# Patient Record
Sex: FEMALE | Race: WHITE | NOT HISPANIC OR LATINO | Employment: OTHER | URBAN - METROPOLITAN AREA
[De-identification: names, ages, dates, MRNs, and addresses within clinical notes are randomized per-mention and may not be internally consistent; named-entity substitution may affect disease eponyms.]

---

## 2019-07-31 ENCOUNTER — TELEPHONE (OUTPATIENT)
Dept: SCHEDULING | Facility: IMAGING CENTER | Age: 29
End: 2019-07-31

## 2019-09-19 ENCOUNTER — OFFICE VISIT (OUTPATIENT)
Dept: MEDICAL GROUP | Facility: MEDICAL CENTER | Age: 29
End: 2019-09-19
Attending: FAMILY MEDICINE
Payer: MEDICAID

## 2019-09-19 VITALS
RESPIRATION RATE: 14 BRPM | BODY MASS INDEX: 23.95 KG/M2 | OXYGEN SATURATION: 97 % | SYSTOLIC BLOOD PRESSURE: 92 MMHG | HEIGHT: 66 IN | HEART RATE: 90 BPM | DIASTOLIC BLOOD PRESSURE: 48 MMHG | WEIGHT: 149 LBS | TEMPERATURE: 97.9 F

## 2019-09-19 DIAGNOSIS — Z30.9 ENCOUNTER FOR CONTRACEPTIVE MANAGEMENT, UNSPECIFIED TYPE: ICD-10-CM

## 2019-09-19 DIAGNOSIS — R53.83 OTHER FATIGUE: ICD-10-CM

## 2019-09-19 DIAGNOSIS — R11.0 NAUSEA: ICD-10-CM

## 2019-09-19 DIAGNOSIS — F41.8 DEPRESSION WITH ANXIETY: ICD-10-CM

## 2019-09-19 PROCEDURE — 99214 OFFICE O/P EST MOD 30 MIN: CPT | Performed by: FAMILY MEDICINE

## 2019-09-19 PROCEDURE — 99213 OFFICE O/P EST LOW 20 MIN: CPT | Performed by: FAMILY MEDICINE

## 2019-09-19 RX ORDER — ONDANSETRON 4 MG/1
15 TABLET, FILM COATED ORAL EVERY 4 HOURS PRN
Qty: 20 TAB | Refills: 0 | Status: SHIPPED | OUTPATIENT
Start: 2019-09-19 | End: 2019-09-23 | Stop reason: SDUPTHER

## 2019-09-19 RX ORDER — NORETHINDRONE ACETATE AND ETHINYL ESTRADIOL .02; 1 MG/1; MG/1
1 TABLET ORAL DAILY
Qty: 28 TAB | Refills: 11 | Status: SHIPPED | OUTPATIENT
Start: 2019-09-19

## 2019-09-19 RX ORDER — OMEPRAZOLE 20 MG/1
20 CAPSULE, DELAYED RELEASE ORAL DAILY
Qty: 30 CAP | Refills: 1 | Status: SHIPPED | OUTPATIENT
Start: 2019-09-19

## 2019-09-19 SDOH — HEALTH STABILITY: MENTAL HEALTH: HOW OFTEN DO YOU HAVE 6 OR MORE DRINKS ON ONE OCCASION?: LESS THAN MONTHLY

## 2019-09-19 SDOH — HEALTH STABILITY: MENTAL HEALTH: HOW MANY STANDARD DRINKS CONTAINING ALCOHOL DO YOU HAVE ON A TYPICAL DAY?: 3 OR 4

## 2019-09-19 SDOH — HEALTH STABILITY: MENTAL HEALTH: HOW OFTEN DO YOU HAVE A DRINK CONTAINING ALCOHOL?: 2-3 TIMES A WEEK

## 2019-09-19 ASSESSMENT — PATIENT HEALTH QUESTIONNAIRE - PHQ9
5. POOR APPETITE OR OVEREATING: 0 - NOT AT ALL
CLINICAL INTERPRETATION OF PHQ2 SCORE: 4
SUM OF ALL RESPONSES TO PHQ QUESTIONS 1-9: 12

## 2019-09-19 NOTE — PROGRESS NOTES
CC: Here to establish care, concerns about chronic nausea    HPI: New patient  Sandhya presents today to establish care, 28 years old female with past medical history significant for depression and anxiety, chronic nausea, moved recently from [place].  Lives with her male partner and works as a writer self-employed, reviewed medical problems medications past surgical and family/social history discussed the following concerns as follows today:      Nausea  Reports having this chronic nausea that is not allowing her to eat when she has it, for at least one year and a half now, on and off.  Recently she has been doing a lot of dietary changes and habits that helped a little bit resolving her nausea and epigastric discomfort, yet she is concerned on why she develops the nausea from the start and would like that to be further investigated, patient said with her previous PCP they did full work-up of hepatitis and CMP and CBC and all her blood work were negative no ultrasound was done.  She always thought that her nausea may be related to her anxiety or the opposite may be because she is not having the nausea all the time that is why she is depressed and anxious.  Patient denies abdominal pain, denies fever denies unintentional weight loss, nausea is more with food.  Tried over-the-counter Tums but now she is not taking anything for that    Depression with anxiety  Reports depression and anxiety without suicidal ideation she said at some point she was severely depressed of thinking that why she is living but she never had a plan or thinking to kill herself.  Not taking any medication at this time would like me to refer her to see psychiatry and therapist for further management    Encounter for contraceptive management, unspecified type  Patient on oral contraceptive pills for more than 6 months now, she is wanting to continue contraception on pills and would like me to refill the medication today last menstruation was 2  weeks ago, regular menstruation on pills     Other fatigue    Reports in general fatigue and tiredness and low energy without unintentional weight loss with her chronic nausea    Current Outpatient Medications   Medication Sig Dispense Refill   • norethindrone-ethinyl estradiol (MICROGESTIN 1/20) 1-20 MG-MCG per tablet Take 1 Tab by mouth every day. 28 Tab 11   • omeprazole (PRILOSEC) 20 MG delayed-release capsule Take 1 Cap by mouth every day. 30 Cap 1   • ondansetron (ZOFRAN) 4 MG Tab tablet Take 4 Tabs by mouth every four hours as needed for Nausea/Vomiting for up to 15 days. 20 Tab 0     No current facility-administered medications for this visit.          Allergies as of 09/19/2019   • (No Known Allergies)        ROS: Denies any chest pain, Shortness of breath, Changes bowel or bladder, Lower extremity edema.    Physical Exam:  Gen.: Well-developed, well-nourished, no apparent distress,pleasant and cooperative with the examination  Skin:  Warm and dry with good turgor. No rashes or suspicious lesions in visible areas  Eye: PERRLA, conjunctiva and sclera clear, lids normal  HEENT: Normocephalic/atraumatic, sinuses nontender with palpation, TMs clear, nares patent with pink mucosa and clear rhinorrhea, lips without lesions, oropharynx clear.  Neck: Trachea midline,no masses or adenopathy  Thyroid: normal consistency and size. No masses or nodules. Not tender with palpation.  Cor: Regular rate and rhythm without murmur, gallop or rub.  Lungs: Respirations unlabored.Clear to auscultation with equal breath sounds bilaterally. No wheezes, rhonchi.  Abdomen: Soft nontender without hepatosplenomegaly or masses appreciated, normoactive bowel sounds. No hernias.  Extremities: No cyanosis, clubbing or edema, Symmetrical without deformities or malformations. Pulses 2+ and symmetrical both upper and lower extremities  Lymphatic: No abnormal adenopathy of the neck groin or axillae.  Psych: Alert and oriented x 3.Normal  affect, judgement,insight and memory.        Assessment and Plan.   28 y.o. female here to establish care    1. Nausea  Chronic problem, first time addressed by me today, discussed with the patient differential diagnosis includes gallbladder disease, gastritis, and anxiety.  We will do blood work and evaluation by an ultrasound for further evaluation advised to use omeprazole and Zofran as needed for her GI symptoms, will consider in the future if work-up is negative to refer her to see GI for further evaluation  - omeprazole (PRILOSEC) 20 MG delayed-release capsule; Take 1 Cap by mouth every day.  Dispense: 30 Cap; Refill: 1  - ondansetron (ZOFRAN) 4 MG Tab tablet; Take 4 Tabs by mouth every four hours as needed for Nausea/Vomiting for up to 15 days.  Dispense: 20 Tab; Refill: 0  - Comp Metabolic Panel; Future  - CBC WITH DIFFERENTIAL; Future  - LIPASE; Future  - OCCULT BLOOD FECES IMMUNOASSAY; Future  - TSH WITH REFLEX TO FT4; Future  - US-ABDOMEN COMPLETE SURVEY; Future  - US-RUQ; Future    2. Depression with anxiety  No suicidal ideations requesting referral to see psychiatry and therapist, I will check her thyroid function  - REFERRAL TO PSYCHIATRY    3. Encounter for contraceptive management, unspecified type  Refilled pills as patient direction would like to continue her oral contraceptive pills  - norethindrone-ethinyl estradiol (MICROGESTIN 1/20) 1-20 MG-MCG per tablet; Take 1 Tab by mouth every day.  Dispense: 28 Tab; Refill: 11    4. Other fatigue  Will do work up to R/O DM, Anemia , Thyroid disease , Kidney disease, might be related to her depression    - omeprazole (PRILOSEC) 20 MG delayed-release capsule; Take 1 Cap by mouth every day.  Dispense: 30 Cap; Refill: 1  - ondansetron (ZOFRAN) 4 MG Tab tablet; Take 4 Tabs by mouth every four hours as needed for Nausea/Vomiting for up to 15 days.  Dispense: 20 Tab; Refill: 0  - Comp Metabolic Panel; Future  - CBC WITH DIFFERENTIAL; Future  - LIPASE; Future  -  OCCULT BLOOD FECES IMMUNOASSAY; Future  - TSH WITH REFLEX TO FT4; Future  - VITAMIN D,25 HYDROXY; Future      Please note that this dictation was created using voice recognition software. I have made every reasonable attempt to correct obvious errors but there may be errors of grammar and content that I may have overlooked prior to finalization of this note.

## 2019-09-23 DIAGNOSIS — R11.0 NAUSEA: ICD-10-CM

## 2019-09-23 DIAGNOSIS — R53.83 OTHER FATIGUE: ICD-10-CM

## 2019-09-24 RX ORDER — ONDANSETRON 4 MG/1
15 TABLET, FILM COATED ORAL EVERY 4 HOURS PRN
Qty: 20 TAB | Refills: 0 | Status: SHIPPED | OUTPATIENT
Start: 2019-09-24 | End: 2019-10-09

## 2019-10-01 ENCOUNTER — HOSPITAL ENCOUNTER (OUTPATIENT)
Dept: RADIOLOGY | Facility: MEDICAL CENTER | Age: 29
End: 2019-10-01
Attending: FAMILY MEDICINE
Payer: MEDICAID

## 2019-10-01 DIAGNOSIS — R11.0 NAUSEA: ICD-10-CM

## 2019-10-01 PROCEDURE — 76700 US EXAM ABDOM COMPLETE: CPT

## 2019-10-02 ENCOUNTER — APPOINTMENT (OUTPATIENT)
Dept: LAB | Facility: MEDICAL CENTER | Age: 29
End: 2019-10-02
Payer: MEDICAID

## 2019-10-15 ENCOUNTER — OFFICE VISIT (OUTPATIENT)
Dept: MEDICAL GROUP | Facility: MEDICAL CENTER | Age: 29
End: 2019-10-15
Attending: FAMILY MEDICINE
Payer: MEDICAID

## 2019-10-15 VITALS
HEART RATE: 70 BPM | BODY MASS INDEX: 24.11 KG/M2 | DIASTOLIC BLOOD PRESSURE: 62 MMHG | HEIGHT: 66 IN | OXYGEN SATURATION: 97 % | WEIGHT: 150 LBS | TEMPERATURE: 97.4 F | SYSTOLIC BLOOD PRESSURE: 112 MMHG | RESPIRATION RATE: 14 BRPM

## 2019-10-15 DIAGNOSIS — Z30.9 ENCOUNTER FOR CONTRACEPTIVE MANAGEMENT, UNSPECIFIED TYPE: ICD-10-CM

## 2019-10-15 DIAGNOSIS — Z91.018 FOOD ALLERGY: ICD-10-CM

## 2019-10-15 DIAGNOSIS — F41.8 ANXIOUS DEPRESSION: ICD-10-CM

## 2019-10-15 PROCEDURE — 99214 OFFICE O/P EST MOD 30 MIN: CPT | Performed by: FAMILY MEDICINE

## 2019-10-15 PROCEDURE — 99213 OFFICE O/P EST LOW 20 MIN: CPT | Performed by: FAMILY MEDICINE

## 2019-10-15 NOTE — PROGRESS NOTES
Chief Complaint:   Chief Complaint   Patient presents with   • Results     labs, done for evaluation of chronic nausea       HPI:Established patient   Sandhya Moyer is a 28 y.o. female who presents for follow-up, discussed the following concerns as follows today:       Food allergy  Patient reports that she noticed that her chronic nausea, has improved after she stopped eating eggs, she is requesting allergy testing and evaluation for possible food allergies.  Denies abdominal pain today, reviewed with the patient all lab work results which shows no evidence of abnormality in the liver function test or pancreatic function test, normal ultrasound.    Anxious depression    Reports chronic and anxiety and depression, patient does not want to use any type of antidepressants or medications at this time requesting to have a referral to see a therapist.  Also discussed with the patient today changing oral contraceptive pills from hormonal to nonhormonal contraception because she is also suspecting that the hormonal treatment is causing her some emotional changes.    Past medical history, family history, social history and medications reviewed and updated in the record. Today   Current medications, problem list and allergies reviewed in Epic today   Health maintenance topics are reviewed and updated.    There are no active problems to display for this patient.    Family History   Problem Relation Age of Onset   • Alcohol abuse Father    • Hypertension Father    • Psychiatric Illness Sister    • Cancer Maternal Grandfather         colon cancer      Social History     Socioeconomic History   • Marital status: Single     Spouse name: Not on file   • Number of children: Not on file   • Years of education: Not on file   • Highest education level: Not on file   Occupational History   • Not on file   Social Needs   • Financial resource strain: Not on file   • Food insecurity:     Worry: Not on file     Inability: Not on file   •  "Transportation needs:     Medical: Not on file     Non-medical: Not on file   Tobacco Use   • Smoking status: Never Smoker   • Smokeless tobacco: Never Used   Substance and Sexual Activity   • Alcohol use: Yes     Alcohol/week: 1.8 oz     Types: 3 Cans of beer per week     Frequency: 2-3 times a week     Drinks per session: 3 or 4     Binge frequency: Less than monthly   • Drug use: Yes     Types: Cocaine, Inhaled     Comment: rare   • Sexual activity: Yes     Partners: Male     Birth control/protection: Pill   Lifestyle   • Physical activity:     Days per week: Not on file     Minutes per session: Not on file   • Stress: Not on file   Relationships   • Social connections:     Talks on phone: Not on file     Gets together: Not on file     Attends Zoroastrian service: Not on file     Active member of club or organization: Not on file     Attends meetings of clubs or organizations: Not on file     Relationship status: Not on file   • Intimate partner violence:     Fear of current or ex partner: Not on file     Emotionally abused: Not on file     Physically abused: Not on file     Forced sexual activity: Not on file   Other Topics Concern   • Not on file   Social History Narrative   • Not on file     Current Outpatient Medications   Medication Sig Dispense Refill   • norethindrone-ethinyl estradiol (MICROGESTIN 1/20) 1-20 MG-MCG per tablet Take 1 Tab by mouth every day. 28 Tab 11   • omeprazole (PRILOSEC) 20 MG delayed-release capsule Take 1 Cap by mouth every day. 30 Cap 1     No current facility-administered medications for this visit.            Review Of Systems  As documented in HPI above  PHYSICAL EXAMINATION:    /62 (BP Location: Left arm, Patient Position: Sitting, BP Cuff Size: Adult)   Pulse 70   Temp 36.3 °C (97.4 °F) (Temporal)   Resp 14   Ht 1.664 m (5' 5.5\")   Wt 68 kg (150 lb)   SpO2 97%   BMI 24.58 kg/m²   Gen.: Well-developed, well-nourished, no apparent distress, pleasant and cooperative " with the examination  HEENT: Normocephalic/atraumatic,  Neck: No JVD or bruits, no adenopathy  Cor: Regular rate and rhythm without murmur gallop or rub  Lungs: Clear to auscultation with equal breath sounds bilaterally. No wheezes, rhonchi.  Abdomen: Soft nontender without hepatosplenomegaly or masses appreciated, normoactive bowel sounds  Extremities: No cyanosis, clubbing or edema          ASSESSMENT/Plan:  1. Food allergy   possibly patient is having allergies from eggs, advised to do allergy testing, place referral to allergist today, nausea improved as long as she is avoiding eggs advised to continue avoiding eggs in her food and also discussed to try egg whites separately than the ER conceive may be she is allergic to you to the [place] only    REFERRAL TO ALLERGY   2. Anxious depression   patient declines the use of medications, advised to follow-up with therapist, also discussed possibility of stopping oral contraceptive pills to see if hormones are the reason for her emotional changes, no red flags no suicidal ideations.    REFERRAL TO PSYCHOLOGY   3. Encounter for contraceptive management, unspecified type   discussed with the patient to try to use condoms, for at least 2 or 3 months for contraception to see if that can help her emotional changes.         Please note that this dictation was created using voice recognition software. I have made every reasonable attempt to correct obvious errors but there may be errors of grammar and content that I may have overlooked prior to finalization of this note.

## 2020-01-14 ENCOUNTER — TELEPHONE (OUTPATIENT)
Dept: MEDICAL GROUP | Facility: MEDICAL CENTER | Age: 30
End: 2020-01-14

## 2020-01-14 NOTE — TELEPHONE ENCOUNTER
----- Message from Sandhya Moyer sent at 1/14/2020  3:11 PM PST -----  Regarding: RE:Referral  Contact: [phone]  Alirio Erickson,   I've called three times and they have still not recieved the referral. Is there a good number I can call to talk to someone in person about this? I don't know what else to do.     - Sandhya  ----- Message -----  From: Luis Campbell Ass't  Sent: 12/17/2019 11:31 AM PST  To: Sandhya Moyer  Subject: Referral  Amos Arthur,     I just got the confirmation it went the the fax number you provided. If you need anything else let us know. Have a Virgie [place].     Thank you,    Georgina Deluca MA

## 2020-01-14 NOTE — TELEPHONE ENCOUNTER
Phone Number Called: [phone] (home)     Call outcome: left message for patient to call back regarding message below    Message: Unable to reach pt, left vm to notify pt I will send mychart message.     Phone Number Called: Phone:  [phone]    Call outcome: Spoke with Radha at [facility] regarding referral    Message: Radha at the  of the office states she talked with the patient. They have the referral, but medicaid is refusing it due to medicaid telling the specialist its not the primary insurance. Radha told me the patient would need to call medicaid and update medicaid regarding the primary billing issues. Our office and Allergy office wouldn't be able to do anything to fix this problem.

## 2020-12-11 ENCOUNTER — NURSE TRIAGE (OUTPATIENT)
Dept: HEALTH INFORMATION MANAGEMENT | Facility: OTHER | Age: 30
End: 2020-12-11

## 2024-07-11 ENCOUNTER — DOCUMENTATION (OUTPATIENT)
Dept: HEALTH INFORMATION MANAGEMENT | Facility: OTHER | Age: 34
End: 2024-07-11
Payer: MEDICAID

## 2024-08-13 ENCOUNTER — TELEPHONE (OUTPATIENT)
Dept: HEALTH INFORMATION MANAGEMENT | Facility: OTHER | Age: 34
End: 2024-08-13
Payer: MEDICAID